# Patient Record
Sex: MALE | Race: WHITE | NOT HISPANIC OR LATINO | URBAN - METROPOLITAN AREA
[De-identification: names, ages, dates, MRNs, and addresses within clinical notes are randomized per-mention and may not be internally consistent; named-entity substitution may affect disease eponyms.]

---

## 2017-02-08 ENCOUNTER — GENERIC CONVERSION - ENCOUNTER (OUTPATIENT)
Dept: OTHER | Facility: OTHER | Age: 67
End: 2017-02-08

## 2017-07-18 ENCOUNTER — ALLSCRIPTS OFFICE VISIT (OUTPATIENT)
Dept: OTHER | Facility: OTHER | Age: 67
End: 2017-07-18
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
## 2017-11-17 ENCOUNTER — GENERIC CONVERSION - ENCOUNTER (OUTPATIENT)
Dept: OTHER | Facility: OTHER | Age: 67
End: 2017-11-17

## 2018-01-10 NOTE — MISCELLANEOUS
Message   Recorded as Task   Date: 02/06/2017 04:36 PM, Created By: Gemini Whatley   Task Name: Care Coordination   Assigned To: [address] [facility]   Regarding Patient: Hilda Bernardo, Status: Active   Comment:    Gemini Whatley - 06 Feb 2017 4:36 PM     TASK CREATED  Caller: Self; Care Coordination; [phone] (Home); [phone] (Work)  traveling to [facility] - wants Levofloxacin or other antibiotic to take with him just in case - will be there from 2/13-2/19 - please advise   Yang Black - 08 Feb 2017 8:47 AM     TASK REASSIGNED: Previously Assigned To Yang Black  traveler's diarrhea  rx sent for levaquin 500 mg x 1 dose  Active Problems    1  Encounter for screening colonoscopy (V76 51) (Z12 11)   2  Encounter for screening for malignant neoplasm of rectum (V76 41) (Z12 12)   3  Primary hypothyroidism (244 9) (E03 9)   4  Screening PSA (prostate specific antigen) (V76 44) (Z12 5)   5  Tick bite (919 4,E906 4) (W57 XXXA)   6  Traveler's diarrhea (009 2) (A09)   7  Type 1 diabetes mellitus without complication (884 83) (M26 3)   8  Vitiligo (709 01) (L80)    Current Meds   1  Advair Diskus 250-50 MCG/DOSE Inhalation Aerosol Powder Breath Activated Recorded   2  HumaLOG KwikPen 100 UNIT/ML SOLN; USE 10 UNITS 3 TIMES DAILY BEFORE   MEALS; Therapy: 31SYO1837 to (Evaluate:23Owc4613)  Requested for: 19FVF2614; Last   Rx:[id number] Ordered   3  Levemir 100 UNIT/ML Subcutaneous Solution; USE 30 UNITS AT BEDTIME; Therapy: 59OPU2528 to (Evaluate:93Ljp3675)  Requested for: 14CON9074; Last   Rx:[id number] Ordered   4  LevoFLOXacin 500 MG Oral Tablet; take 1 tab by mouth x 1 dose at onset of diarrhea; Therapy: 49PDV8663 to (Last Rx:60Aws6530) Ordered   5  OneTouch Ultra Blue In Vitro Strip; TESTING 6 TIMES DAILY  Requested for:   52JLJ2546; Last ZJ:[id number] Ordered   6  Synthroid 150 MCG Oral Tablet (Levothyroxine Sodium);    Therapy: (Recorded:27Sep2013) to Recorded    Allergies    1  No Known Drug Allergies    Signatures   Electronically signed by :  Souleymane Diggs, ; Feb 8 2017  3:16PM EST                       (Author)

## 2018-01-14 VITALS
DIASTOLIC BLOOD PRESSURE: 62 MMHG | WEIGHT: 156 LBS | SYSTOLIC BLOOD PRESSURE: 128 MMHG | BODY MASS INDEX: 27.64 KG/M2 | TEMPERATURE: 98.2 F | HEART RATE: 72 BPM | HEIGHT: 63 IN

## 2018-02-12 ENCOUNTER — TELEPHONE (OUTPATIENT)
Dept: FAMILY MEDICINE CLINIC | Facility: HOSPITAL | Age: 68
End: 2018-02-12

## 2018-02-12 ENCOUNTER — OFFICE VISIT (OUTPATIENT)
Dept: FAMILY MEDICINE CLINIC | Facility: HOSPITAL | Age: 68
End: 2018-02-12
Payer: COMMERCIAL

## 2018-02-12 VITALS
DIASTOLIC BLOOD PRESSURE: 78 MMHG | HEART RATE: 82 BPM | SYSTOLIC BLOOD PRESSURE: 132 MMHG | HEIGHT: 69 IN | WEIGHT: 159 LBS | BODY MASS INDEX: 23.55 KG/M2 | TEMPERATURE: 99.2 F

## 2018-02-12 DIAGNOSIS — J06.9 UPPER RESPIRATORY TRACT INFECTION, UNSPECIFIED TYPE: Primary | ICD-10-CM

## 2018-02-12 DIAGNOSIS — J45.30 MILD PERSISTENT ASTHMA WITHOUT COMPLICATION: ICD-10-CM

## 2018-02-12 DIAGNOSIS — J30.81 CHRONIC ALLERGIC RHINITIS DUE TO ANIMAL HAIR AND DANDER: ICD-10-CM

## 2018-02-12 PROCEDURE — 99214 OFFICE O/P EST MOD 30 MIN: CPT | Performed by: INTERNAL MEDICINE

## 2018-02-12 RX ORDER — BENZONATATE 100 MG/1
100 CAPSULE ORAL 3 TIMES DAILY PRN
Qty: 30 CAPSULE | Refills: 0 | Status: SHIPPED | OUTPATIENT
Start: 2018-02-12 | End: 2018-02-22

## 2018-02-12 RX ORDER — LEVOTHYROXINE SODIUM 0.15 MG/1
150 TABLET ORAL DAILY
COMMUNITY

## 2018-02-12 RX ORDER — MOMETASONE FUROATE 50 UG/1
2 SPRAY, METERED NASAL DAILY
Qty: 17 G | Refills: 3 | Status: SHIPPED | OUTPATIENT
Start: 2018-02-12 | End: 2020-03-10

## 2018-02-12 RX ORDER — ALBUTEROL SULFATE 90 UG/1
2 AEROSOL, METERED RESPIRATORY (INHALATION)
COMMUNITY
Start: 2013-03-13

## 2018-02-12 RX ORDER — MOMETASONE FUROATE 50 UG/1
2 SPRAY, METERED NASAL
COMMUNITY
Start: 2017-05-11 | End: 2018-02-12 | Stop reason: SDUPTHER

## 2018-02-12 RX ORDER — NICOTINE POLACRILEX 2 MG
GUM BUCCAL
COMMUNITY

## 2018-02-12 NOTE — PROGRESS NOTES
Assessment/Plan:    Mild persistent asthma  Reassured pt that no current s/sx of exacerbation but URI's are common triggers - advised to increase Advair to 1 puff INH bid for now until wheezing /cough resolves, rx for [employer] sent, advised to call with new/worsening symptoms or if no better at all by Friday       Diagnoses and all orders for this visit:    Upper respiratory tract infection, unspecified type  Comments:  Pt reassured that s/sx c/w viral URI - symptomatic tx was encouraged and urged to watch for s/sx of asthma exacerbation  Orders:  -     benzonatate (TESSALON PERLES) 100 mg capsule; Take 1 capsule (100 mg total) by mouth 3 (three) times a day as needed for cough for up to 10 days    Chronic allergic rhinitis due to animal hair and dander  -     mometasone (NASONEX) 50 mcg/act nasal spray; 2 sprays into each nostril daily    Mild persistent asthma without complication    Other orders  -     albuterol (PROVENTIL HFA,VENTOLIN HFA) 90 mcg/act inhaler; Inhale 2 puffs  -     B Complex Vitamins (VITAMIN B COMPLEX PO); Take one tablet by mouth daily  -     Biotin 1 MG CAPS; Take by mouth  -     Cholecalciferol 1000 units capsule; Take 1 capsule by mouth daily  -     fluticasone-salmeterol (ADVAIR DISKUS) 100-50 mcg/dose; Inhale 1 puff daily  -     insulin degludec (TRESIBA) injection pen 100 units/mL; Inject 25 Units under the skin daily  -     Discontinue: mometasone (NASONEX) 50 mcg/act nasal spray; 2 sprays into each nostril  -     insulin lispro (HumaLOG) 100 Units/mL SOPN; Up to 15 units tid ac        Rest/fluids/lozenges/hot tea/garles and OTC decongestant was recommended, rx for Tessalon Perles was sent to pharmacy, urged to restart Nasonex; d/w pt that cough can last 4-6 wks but call with new/worsening symptoms      Subjective:      Patient ID: Serenity Peter is a 74 y o  male  HPI Pt here for sick visit  Symptoms started Friday night with sneezing/runny nose/itchy eyes and cough    He states he has a productive cough with yellow phlegm but no significant SOB/wheezing/F but has had some chills  He notes no Sinus pain or pressure/HA but he has had a scratchy throat and "inflammed bronchus"  He has has been using Advil and Robitusan  He has Nasonex at home but has not been using it - needs rx refilled        Review of Systems   Constitutional: Positive for chills  Negative for fatigue and fever  HENT: Positive for congestion  Negative for ear pain, sinus pain and sore throat  Eyes: Negative for pain and redness  Respiratory: Positive for cough  Negative for chest tightness and shortness of breath  Cardiovascular: Negative for chest pain, palpitations and leg swelling  Gastrointestinal: Negative for abdominal pain, blood in stool, constipation, diarrhea, nausea and vomiting  Endocrine: Negative for polydipsia and polyuria  Genitourinary: Negative for difficulty urinating and dysuria  Musculoskeletal: Negative for arthralgias and myalgias  Skin: Negative for rash and wound  Neurological: Negative for dizziness and headaches  Hematological: Does not bruise/bleed easily  Psychiatric/Behavioral: Negative for behavioral problems and confusion  Objective:    Vitals:    02/12/18 1449   BP: 132/78   Pulse: 82   Temp: 99 2 °F (37 3 °C)        Physical Exam   Constitutional: He appears well-developed and well-nourished  HENT:   Head: Normocephalic and atraumatic  Left Ear: External ear normal    Mouth/Throat: No oropharyngeal exudate  R ear with scan blood in outer ear canal but TM intact with nml light reflex and no erythema   Eyes: EOM are normal  Pupils are equal, round, and reactive to light  Right eye exhibits no discharge  Left eye exhibits no discharge  Neck:   B/L submandibular adenopathy   Cardiovascular: Normal rate and regular rhythm  No murmur heard  Pulmonary/Chest: Effort normal  No respiratory distress  He has wheezes  He has no rales     Scattered wheezing B/L lung fields   Abdominal: Soft  He exhibits no distension  There is no tenderness  Musculoskeletal: He exhibits no edema  Lymphadenopathy:     He has cervical adenopathy  Neurological: He is alert  He exhibits normal muscle tone  Skin: Skin is warm and dry  No rash noted  Psychiatric: He has a normal mood and affect   His behavior is normal

## 2019-01-21 ENCOUNTER — TELEPHONE (OUTPATIENT)
Dept: FAMILY MEDICINE CLINIC | Facility: HOSPITAL | Age: 69
End: 2019-01-21

## 2019-04-23 ENCOUNTER — TELEPHONE (OUTPATIENT)
Dept: FAMILY MEDICINE CLINIC | Facility: HOSPITAL | Age: 69
End: 2019-04-23

## 2019-08-16 ENCOUNTER — TELEPHONE (OUTPATIENT)
Dept: FAMILY MEDICINE CLINIC | Facility: HOSPITAL | Age: 69
End: 2019-08-16

## 2019-11-25 ENCOUNTER — OFFICE VISIT (OUTPATIENT)
Dept: UROLOGY | Facility: MEDICAL CENTER | Age: 69
End: 2019-11-25
Payer: COMMERCIAL

## 2019-11-25 VITALS
BODY MASS INDEX: 23.79 KG/M2 | WEIGHT: 157 LBS | DIASTOLIC BLOOD PRESSURE: 80 MMHG | SYSTOLIC BLOOD PRESSURE: 120 MMHG | HEIGHT: 68 IN | HEART RATE: 69 BPM

## 2019-11-25 DIAGNOSIS — E11.69 ERECTILE DYSFUNCTION ASSOCIATED WITH TYPE 2 DIABETES MELLITUS (HCC): Primary | ICD-10-CM

## 2019-11-25 DIAGNOSIS — N40.0 BPH WITHOUT OBSTRUCTION/LOWER URINARY TRACT SYMPTOMS: ICD-10-CM

## 2019-11-25 DIAGNOSIS — N52.1 ERECTILE DYSFUNCTION ASSOCIATED WITH TYPE 2 DIABETES MELLITUS (HCC): Primary | ICD-10-CM

## 2019-11-25 PROCEDURE — 99244 OFF/OP CNSLTJ NEW/EST MOD 40: CPT | Performed by: UROLOGY

## 2019-11-25 RX ORDER — OMEPRAZOLE 20 MG/1
20 CAPSULE, DELAYED RELEASE ORAL DAILY
COMMUNITY
Start: 2018-12-26 | End: 2019-12-26

## 2019-11-25 RX ORDER — VARDENAFIL HYDROCHLORIDE 5 MG/1
TABLET ORAL
COMMUNITY
Start: 2018-10-18 | End: 2019-11-25

## 2019-11-25 RX ORDER — TADALAFIL 20 MG/1
20 TABLET ORAL AS NEEDED
Qty: 5 TABLET | Refills: 0 | Status: SHIPPED | OUTPATIENT
Start: 2019-11-25 | End: 2021-05-19

## 2019-11-25 RX ORDER — RANITIDINE 150 MG/1
150 TABLET ORAL
COMMUNITY
Start: 2018-12-26 | End: 2019-12-26

## 2019-11-25 NOTE — PROGRESS NOTES
Assessment/Plan:  1  Erectile dysfunction secondary to diabetes mellitus-the patient has not responded to Levitra in the past   He will be given a trial of Cialis 20 mg on an as needed basis and if that does not work to produce an adequate erection he will be given an alprostadil prescription and enter an injection therapy pathway    2  BPH without obstruction-patient is due for PSA testing in December of 2019  This will be ordered  ERIKA not suspicious for malignancy  3  Left spermatocele-incidental with history of previous vasectomy-no intervention   yNo problem-specific Assessment & Plan notes found for this encounter  Diagnoses and all orders for this visit:    Erectile dysfunction associated with type 2 diabetes mellitus (HCC)  -     PROSTAGLANDIN PGE1 INJECTABLE 20 MCG/ML, STANDARD, IJ SOLN; 1 mL by Intracavernosal route once for 1 dose  -     tadalafil (CIALIS) 20 MG tablet; Take 1 tablet (20 mg total) by mouth as needed for erectile dysfunction    BPH without obstruction/lower urinary tract symptoms  -     PSA Total, Diagnostic; Future    Other orders  -     OMEGA-3 FATTY ACIDS-VITAMIN E PO; Take one tablet by mouth daily  -     ciclopirox (PENLAC) 8 % solution; Apply to affected nails daily  -     omeprazole (PRILOSEC) 20 mg delayed release capsule; Take 20 mg by mouth daily  -     ranitidine (ZANTAC) 150 mg tablet; Take 150 mg by mouth  -     Discontinue: vardenafil (LEVITRA) 5 MG tablet; One daily as  needed          Subjective:      Patient ID: Kvng Malcolm is a 81 y o  male  HPI  71-year-old male with history of insulin-dependent diabetes mellitus referred by Dr Ashlie Gordon for evaluation of erectile dysfunction  The patient does not get any erectile activity on his own capable of vaginal penetration for the most part  He is able to stimulate adequately to have some sexual activity but this is unreliable and he has problems with rigidity and difficulty maintaining erection    He has not responded adequately to previous treatment with Levitra 5 mg on a daily basis but does not take it on a daily basis noting that it gives him hot flashes and some nasal side effects  The patient now presents for evaluation of erectile dysfunction  He does not to have any significant obstructive or irritative voiding symptoms denying dysuria gross hematuria although the patient does note some early morning decrease in his urinary stream       The following portions of the patient's history were reviewed and updated as appropriate: allergies, current medications, past family history, past medical history, past social history, past surgical history and problem list     Review of Systems   All other systems reviewed and are negative  Objective:      /80   Pulse 69   Ht 5' 8" (1 727 m)   Wt 71 2 kg (157 lb)   BMI 23 87 kg/m²          Physical Exam   Constitutional: He is oriented to person, place, and time  He appears well-developed and well-nourished  No distress  HENT:   Head: Normocephalic and atraumatic  Eyes: EOM are normal    Neck: Neck supple  Pulmonary/Chest: Effort normal  No respiratory distress  Abdominal: Soft  He exhibits no distension  Genitourinary:   Genitourinary Comments: Phallus normal circumcised without cutaneous lesions  Scrotum normal without cutaneous lesions  Testes normal bilaterally without masses or tenderness  Adnexa on the right is normal un on the left there is a spermatocele proximally 1/2 to 2 cm in diameter on the head of the epididymis  ERIKA normal anal verge normal anal sphincter tone no rectal masses palpable and the prostate is approximately 25 g a nodular nontender   Neurological: He is alert and oriented to person, place, and time  Psychiatric: He has a normal mood and affect  His behavior is normal  Judgment and thought content normal    Vitals reviewed

## 2019-11-25 NOTE — LETTER
November 25, 2019     Herbert Jacob DO  [address]    Patient: Miri Demarco   YOB: 1950   Date of Visit: 11/25/2019       Dear Dr Latrell Reyes: Thank you for referring Miri Demarco to me for evaluation  Below are my notes for this consultation  If you have questions, please do not hesitate to call me  I look forward to following your patient along with you  Sincerely,        Shruthi Eli MD        CC: MD Shruthi Reyes MD  11/25/2019 11:42 AM  Sign at close encounter  Assessment/Plan:  1  Erectile dysfunction secondary to diabetes mellitus-the patient has not responded to Levitra in the past   He will be given a trial of Cialis 20 mg on an as needed basis and if that does not work to produce an adequate erection he will be given an alprostadil prescription and enter an injection therapy pathway    2  BPH without obstruction-patient is due for PSA testing in December of 2019  This will be ordered  ERIKA not suspicious for malignancy  3  Left spermatocele-incidental with history of previous vasectomy-no intervention   yNo problem-specific Assessment & Plan notes found for this encounter  Diagnoses and all orders for this visit:    Erectile dysfunction associated with type 2 diabetes mellitus (HCC)  -     PROSTAGLANDIN PGE1 INJECTABLE 20 MCG/ML, STANDARD, IJ SOLN; 1 mL by Intracavernosal route once for 1 dose  -     tadalafil (CIALIS) 20 MG tablet; Take 1 tablet (20 mg total) by mouth as needed for erectile dysfunction    BPH without obstruction/lower urinary tract symptoms  -     PSA Total, Diagnostic; Future    Other orders  -     OMEGA-3 FATTY ACIDS-VITAMIN E PO; Take one tablet by mouth daily  -     ciclopirox (PENLAC) 8 % solution; Apply to affected nails daily  -     omeprazole (PRILOSEC) 20 mg delayed release capsule; Take 20 mg by mouth daily  -     ranitidine (ZANTAC) 150 mg tablet;  Take 150 mg by mouth  -     Discontinue: vardenafil (LEVITRA) 5 MG tablet; One daily as  needed          Subjective:      Patient ID: Juanis Pedroza is a 81 y o  male  HPI  42-year-old male with history of insulin-dependent diabetes mellitus referred by Dr Pedro Renae for evaluation of erectile dysfunction  The patient does not get any erectile activity on his own capable of vaginal penetration for the most part  He is able to stimulate adequately to have some sexual activity but this is unreliable and he has problems with rigidity and difficulty maintaining erection  He has not responded adequately to previous treatment with Levitra 5 mg on a daily basis but does not take it on a daily basis noting that it gives him hot flashes and some nasal side effects  The patient now presents for evaluation of erectile dysfunction  He does not to have any significant obstructive or irritative voiding symptoms denying dysuria gross hematuria although the patient does note some early morning decrease in his urinary stream       The following portions of the patient's history were reviewed and updated as appropriate: allergies, current medications, past family history, past medical history, past social history, past surgical history and problem list     Review of Systems   All other systems reviewed and are negative  Objective:      /80   Pulse 69   Ht 5' 8" (1 727 m)   Wt 71 2 kg (157 lb)   BMI 23 87 kg/m²           Physical Exam   Constitutional: He is oriented to person, place, and time  He appears well-developed and well-nourished  No distress  HENT:   Head: Normocephalic and atraumatic  Eyes: EOM are normal    Neck: Neck supple  Pulmonary/Chest: Effort normal  No respiratory distress  Abdominal: Soft  He exhibits no distension  Genitourinary:   Genitourinary Comments: Phallus normal circumcised without cutaneous lesions  Scrotum normal without cutaneous lesions    Testes normal bilaterally without masses or tenderness  Adnexa on the right is normal un on the left there is a spermatocele proximally 1/2 to 2 cm in diameter on the head of the epididymis  ERIKA normal anal verge normal anal sphincter tone no rectal masses palpable and the prostate is approximately 25 g a nodular nontender   Neurological: He is alert and oriented to person, place, and time  Psychiatric: He has a normal mood and affect  His behavior is normal  Judgment and thought content normal    Vitals reviewed

## 2020-01-06 ENCOUNTER — OFFICE VISIT (OUTPATIENT)
Dept: UROLOGY | Facility: MEDICAL CENTER | Age: 70
End: 2020-01-06
Payer: COMMERCIAL

## 2020-01-06 VITALS
HEART RATE: 59 BPM | SYSTOLIC BLOOD PRESSURE: 120 MMHG | WEIGHT: 157 LBS | DIASTOLIC BLOOD PRESSURE: 70 MMHG | BODY MASS INDEX: 23.79 KG/M2 | HEIGHT: 68 IN

## 2020-01-06 DIAGNOSIS — E11.69 ERECTILE DYSFUNCTION ASSOCIATED WITH TYPE 2 DIABETES MELLITUS (HCC): Primary | ICD-10-CM

## 2020-01-06 DIAGNOSIS — N52.1 ERECTILE DYSFUNCTION ASSOCIATED WITH TYPE 2 DIABETES MELLITUS (HCC): Primary | ICD-10-CM

## 2020-01-06 PROCEDURE — 99213 OFFICE O/P EST LOW 20 MIN: CPT | Performed by: UROLOGY

## 2020-01-06 PROCEDURE — 54235 NJX CORPORA CAVERNOSA RX AGT: CPT | Performed by: UROLOGY

## 2020-01-06 NOTE — PROGRESS NOTES
Assessment/Plan:  1  Erectile dysfunction secondary to diabetes mellitus  Patient did not respond adequately to Cialis 20 mg although he did note that he had 2 mass in superior it the patient however had extreme discomfort in his knee with some swelling as well as extreme discomfort in his esophagus such that he can no longer tolerate this medication  He has failed both Levitra and Cialis and therefore was given a 10 micro g injection of alprostadil today without incident    2 BPH without obstruction -PSA well within normal limits  Repeat yearly    3  Left spermatocele -no intervention  No problem-specific Assessment & Plan notes found for this encounter  There are no diagnoses linked to this encounter  Subjective:      Patient ID: Josue Ku is a 81 y o  male  HPI   82-year-old male with a history of insulin-dependent diabetes mellitus referred by Endocrinology for evaluation of erectile dysfunction  Patient does not get any usable erectile activity on his own and in the past however he has responded to treatment with daily Levitra 5 mg  The patient however no longer response to even maximal doses of Levitra and presents after failing to get an adequate response from Cialis 20 mg  He did note a tumescent response however rigidity was borderline capable of penetration the patient had significant GI upset and pain in his right lower extremity particularly at the knee with swelling associated with taking Cialis  The patient presents today for injection therapy  The following portions of the patient's history were reviewed and updated as appropriate: allergies, current medications, past family history, past medical history, past social history, past surgical history and problem list     Review of Systems   All other systems reviewed and are negative          Objective:      /70   Pulse 59   Ht 5' 8" (1 727 m)   Wt 71 2 kg (157 lb)   BMI 23 87 kg/m²          Physical Exam Constitutional: He is oriented to person, place, and time  He appears well-nourished  No distress  HENT:   Head: Atraumatic  Eyes: EOM are normal    Neck: Neck supple  Cardiovascular: Normal rate  Pulmonary/Chest: Effort normal    Abdominal: Soft  Genitourinary: Penis normal    Neurological: He is alert and oriented to person, place, and time  Psychiatric: He has a normal mood and affect  His behavior is normal  Judgment and thought content normal    Vitals reviewed  Injection corpora cavernosa     Date/Time 1/6/2020 2:18 PM     Performed by  Pallavi Rangel MD     Authorized by Pallavi Rangel MD      Universal Protocol Consent: Verbal consent obtained  Risks and benefits: risks, benefits and alternatives were discussed  Consent given by: patient  Patient understanding: patient states understanding of the procedure being performed  Patient consent: the patient's understanding of the procedure matches consent given  Procedure consent: procedure consent matches procedure scheduled  Relevant documents: relevant documents present and verified  Test results: test results available and properly labeled  Required items: required blood products, implants, devices, and special equipment available  Patient identity confirmed: verbally with patient        Site preparation: Isopropyl alcohol    Local anesthesia used: no     Anesthesia   Local anesthesia used: no     Sedation   Patient sedated: no        Specimen: no   Procedure Details   Procedure Notes:   After prepping the penile shaft with isopropyl alcohol 0 5 cc of a 20 micro g per cc mixture of PGE 1 was administered at by injection into the left corpora  Patient tolerated this without incident    He will return in 1 week for additional dosing  Patient tolerance: Patient tolerated the procedure well with no immediate complications

## 2020-01-28 ENCOUNTER — OFFICE VISIT (OUTPATIENT)
Dept: UROLOGY | Facility: MEDICAL CENTER | Age: 70
End: 2020-01-28
Payer: COMMERCIAL

## 2020-01-28 VITALS
DIASTOLIC BLOOD PRESSURE: 80 MMHG | HEART RATE: 98 BPM | SYSTOLIC BLOOD PRESSURE: 118 MMHG | BODY MASS INDEX: 23.79 KG/M2 | HEIGHT: 68 IN | WEIGHT: 157 LBS

## 2020-01-28 DIAGNOSIS — N52.1 ERECTILE DYSFUNCTION ASSOCIATED WITH TYPE 2 DIABETES MELLITUS (HCC): Primary | ICD-10-CM

## 2020-01-28 DIAGNOSIS — E11.69 ERECTILE DYSFUNCTION ASSOCIATED WITH TYPE 2 DIABETES MELLITUS (HCC): Primary | ICD-10-CM

## 2020-01-28 PROCEDURE — 99213 OFFICE O/P EST LOW 20 MIN: CPT | Performed by: UROLOGY

## 2020-01-28 PROCEDURE — 54235 NJX CORPORA CAVERNOSA RX AGT: CPT | Performed by: UROLOGY

## 2020-01-28 NOTE — PROGRESS NOTES
Injection corpora cavernosa     Date/Time 1/28/2020 9:02 AM     Performed by  Dia Allen MD     Authorized by Dia Allen MD      Universal Protocol Risks and benefits: risks, benefits and alternatives were discussed  Consent given by: patient  Patient understanding: patient states understanding of the procedure being performed  Patient consent: the patient's understanding of the procedure matches consent given  Procedure consent: procedure consent matches procedure scheduled  Patient identity confirmed: verbally with patient        Site preparation: Isopropyl alcohol    Local anesthesia used: no     Anesthesia   Local anesthesia used: no     Sedation   Patient sedated: no        Specimen: no    Culture: no   Procedure Details   Procedure Notes: Patient noted that with a 10 micro g alprostadil injection he got tumescent but there was less than adequate rigidity  He also noted some degree of discomfort with the medication itself  Sensation lasted for approximately 3 hours as did the semi erection  After prepping the penile shaft today with isopropyl alcohol 1 cc of a 20 micro g per cc alprostadil mixture was administered in the left corpora  The patient tolerated the procedure well  He will return in 1 week for repeat injection possibly with Bi Mix    Patient tolerance: Patient tolerated the procedure well with no immediate complications

## 2020-02-06 ENCOUNTER — OFFICE VISIT (OUTPATIENT)
Dept: UROLOGY | Facility: MEDICAL CENTER | Age: 70
End: 2020-02-06
Payer: COMMERCIAL

## 2020-02-06 VITALS — WEIGHT: 157 LBS | BODY MASS INDEX: 23.79 KG/M2 | HEIGHT: 68 IN

## 2020-02-06 DIAGNOSIS — N52.1 ERECTILE DYSFUNCTION ASSOCIATED WITH TYPE 2 DIABETES MELLITUS (HCC): Primary | ICD-10-CM

## 2020-02-06 DIAGNOSIS — E11.69 ERECTILE DYSFUNCTION ASSOCIATED WITH TYPE 2 DIABETES MELLITUS (HCC): Primary | ICD-10-CM

## 2020-02-06 PROCEDURE — 1036F TOBACCO NON-USER: CPT | Performed by: UROLOGY

## 2020-02-06 PROCEDURE — 4040F PNEUMOC VAC/ADMIN/RCVD: CPT | Performed by: UROLOGY

## 2020-02-06 PROCEDURE — 54235 NJX CORPORA CAVERNOSA RX AGT: CPT | Performed by: UROLOGY

## 2020-02-06 PROCEDURE — 99213 OFFICE O/P EST LOW 20 MIN: CPT | Performed by: UROLOGY

## 2020-02-06 PROCEDURE — 1160F RVW MEDS BY RX/DR IN RCRD: CPT | Performed by: UROLOGY

## 2020-02-06 PROCEDURE — 3008F BODY MASS INDEX DOCD: CPT | Performed by: UROLOGY

## 2020-02-06 NOTE — PROGRESS NOTES
Injection corpora cavernosa     Date/Time 2/6/2020 3:38 PM     Performed by  Yadira Beltran MD     Authorized by Yadira Beltran MD      Universal Protocol Consent: Verbal consent obtained  Risks and benefits: risks, benefits and alternatives were discussed  Consent given by: patient  Patient understanding: patient states understanding of the procedure being performed  Patient consent: the patient's understanding of the procedure matches consent given  Procedure consent: procedure consent matches procedure scheduled  Required items: required blood products, implants, devices, and special equipment available  Patient identity confirmed: verbally with patient        Site preparation: Isopropyl alcohol    Local anesthesia used: no     Anesthesia   Local anesthesia used: no     Sedation   Patient sedated: no        Specimen: no    Culture: no   Procedure Details   Procedure Notes: Patient noted that 1 cc or 20 micro g of alprostadil did not adequately give him a rigid reliable erection  The patient did note some discomfort with alprostadil and therefore after isopropyl alcohol was used to prep the penile shaft 0 5 cc of standard Bi Mix was injected in the left corpora  Patient will return for further dosing    There were no complications  Patient tolerance: Patient tolerated the procedure well with no immediate complications

## 2020-03-10 ENCOUNTER — OFFICE VISIT (OUTPATIENT)
Dept: UROLOGY | Facility: MEDICAL CENTER | Age: 70
End: 2020-03-10
Payer: COMMERCIAL

## 2020-03-10 VITALS — HEIGHT: 68 IN | BODY MASS INDEX: 23.79 KG/M2 | WEIGHT: 157 LBS

## 2020-03-10 DIAGNOSIS — N52.1 ERECTILE DYSFUNCTION ASSOCIATED WITH TYPE 2 DIABETES MELLITUS (HCC): Primary | ICD-10-CM

## 2020-03-10 DIAGNOSIS — E11.69 ERECTILE DYSFUNCTION ASSOCIATED WITH TYPE 2 DIABETES MELLITUS (HCC): Primary | ICD-10-CM

## 2020-03-10 PROCEDURE — 54235 NJX CORPORA CAVERNOSA RX AGT: CPT | Performed by: UROLOGY

## 2020-03-10 PROCEDURE — 99213 OFFICE O/P EST LOW 20 MIN: CPT | Performed by: UROLOGY

## 2020-03-10 NOTE — PROGRESS NOTES
Injection corpora cavernosa     Date/Time 3/10/2020 3:39 PM     Performed by  Eric Colmenares MD     Authorized by Eric Colmenares MD      Universal Protocol Consent: Verbal consent obtained  Risks and benefits: risks, benefits and alternatives were discussed  Consent given by: patient  Patient understanding: patient states understanding of the procedure being performed  Patient consent: the patient's understanding of the procedure matches consent given  Procedure consent: procedure consent matches procedure scheduled  Required items: required blood products, implants, devices, and special equipment available  Patient identity confirmed: verbally with patient        Site preparation: Isopropyl alcohol    Local anesthesia used: no     Anesthesia   Local anesthesia used: no     Sedation   Patient sedated: no        Specimen: no    Culture: no   Procedure Details   Procedure Notes: The patient returns to the office and notes that 0 5 cc of Bi Mix gave him a tumescent erection however was not rigid enough for penetration and did last long enough for maintenance  The patient was given 1 cc of Bi Mix into the left corpora after prepping the penile shaft with isopropyl alcohol and was given instructions as to injection technique  The patient will return in 1 week for further consideration towards injection therapy  If he does not get an adequate erection with Bi Mix 1 cc tri mix will be tried    Tri mix prescription was written and the patient will return with that if he  Wishes to further used injection therapy  Patient tolerance: Patient tolerated the procedure well with no immediate complications

## 2020-03-16 ENCOUNTER — TELEPHONE (OUTPATIENT)
Dept: UROLOGY | Facility: MEDICAL CENTER | Age: 70
End: 2020-03-16

## 2020-03-16 NOTE — TELEPHONE ENCOUNTER
COVID Screening Questions    Have you recently traveled? NO  Have you been in contact with any one who is sick? NO  Do you have a cough, fever, or shortness of breath?   NO

## 2020-04-22 ENCOUNTER — TELEPHONE (OUTPATIENT)
Dept: UROLOGY | Facility: MEDICAL CENTER | Age: 70
End: 2020-04-22

## 2020-07-09 ENCOUNTER — TELEPHONE (OUTPATIENT)
Dept: UROLOGY | Facility: MEDICAL CENTER | Age: 70
End: 2020-07-09

## 2020-07-14 ENCOUNTER — OFFICE VISIT (OUTPATIENT)
Dept: UROLOGY | Facility: MEDICAL CENTER | Age: 70
End: 2020-07-14
Payer: COMMERCIAL

## 2020-07-14 VITALS
HEART RATE: 93 BPM | DIASTOLIC BLOOD PRESSURE: 78 MMHG | HEIGHT: 68 IN | TEMPERATURE: 98.6 F | WEIGHT: 160 LBS | SYSTOLIC BLOOD PRESSURE: 110 MMHG | BODY MASS INDEX: 24.25 KG/M2

## 2020-07-14 DIAGNOSIS — E11.69 ERECTILE DYSFUNCTION ASSOCIATED WITH TYPE 2 DIABETES MELLITUS (HCC): Primary | ICD-10-CM

## 2020-07-14 DIAGNOSIS — N52.1 ERECTILE DYSFUNCTION ASSOCIATED WITH TYPE 2 DIABETES MELLITUS (HCC): Primary | ICD-10-CM

## 2020-07-14 DIAGNOSIS — Z12.5 PROSTATE CANCER SCREENING: ICD-10-CM

## 2020-07-14 PROCEDURE — 54235 NJX CORPORA CAVERNOSA RX AGT: CPT | Performed by: UROLOGY

## 2020-07-14 PROCEDURE — 99213 OFFICE O/P EST LOW 20 MIN: CPT | Performed by: UROLOGY

## 2020-07-14 NOTE — PROGRESS NOTES
Injection corpora cavernosa     Date/Time 7/14/2020 12:03 PM     Performed by  Rashel Salgado MD     Authorized by Rashel Salgado MD      Universal Protocol Consent: Verbal consent obtained  Risks and benefits: risks, benefits and alternatives were discussed  Consent given by: patient  Patient understanding: patient states understanding of the procedure being performed  Patient consent: the patient's understanding of the procedure matches consent given  Procedure consent: procedure consent matches procedure scheduled  Required items: required blood products, implants, devices, and special equipment available  Patient identity confirmed: verbally with patient  Time out: Immediately prior to procedure a "time out" was called to verify the correct patient, procedure, equipment, support staff and site/side marked as required  Site preparation: Isopropyl alcohol    Local anesthesia used: no     Anesthesia   Local anesthesia used: no     Sedation   Patient sedated: no        Specimen: no    Culture: no   Procedure Details   Procedure Notes: The patient reports that 1 cc of Bi Mix gave him an erection adequate for sexual intercourse in terms of rigidity with adequate length or duration of affect  He had no side effects  The patient did want to try Tri Mix and was given a 1 cc of standard tri mix injection into the right corpora after prepping with isopropyl alcohol  The patient was able to inject himself without difficulty and expressed understanding of injection techniques including drawing up the solution  Patient will contact me if he is having problems with injection therapy will continue injecting 1 cc of Tri Mix    He will return to the office in approximately 6 months for ERIKA PSA chemistry profile and review of response  Patient tolerance: Patient tolerated the procedure well with no immediate complications

## 2020-10-22 NOTE — ASSESSMENT & PLAN NOTE
Please call Trinity from Dr Simpson's office regarding [facility]    [phone]   Reassured pt that no current s/sx of exacerbation but URI's are common triggers - advised to increase Advair to 1 puff INH bid for now until wheezing /cough resolves, rx for [employer] sent, advised to call with new/worsening symptoms or if no better at all by Friday

## 2021-05-19 ENCOUNTER — OFFICE VISIT (OUTPATIENT)
Dept: UROLOGY | Facility: MEDICAL CENTER | Age: 71
End: 2021-05-19
Payer: COMMERCIAL

## 2021-05-19 VITALS
BODY MASS INDEX: 23.79 KG/M2 | HEART RATE: 107 BPM | DIASTOLIC BLOOD PRESSURE: 82 MMHG | SYSTOLIC BLOOD PRESSURE: 114 MMHG | HEIGHT: 68 IN | WEIGHT: 157 LBS

## 2021-05-19 DIAGNOSIS — N40.1 BPH WITH OBSTRUCTION/LOWER URINARY TRACT SYMPTOMS: ICD-10-CM

## 2021-05-19 DIAGNOSIS — N52.9 ERECTILE DYSFUNCTION DUE TO TYPE 1 DIABETES MELLITUS (HCC): Primary | ICD-10-CM

## 2021-05-19 DIAGNOSIS — N13.8 BPH WITH OBSTRUCTION/LOWER URINARY TRACT SYMPTOMS: ICD-10-CM

## 2021-05-19 DIAGNOSIS — Z12.5 PROSTATE CANCER SCREENING: ICD-10-CM

## 2021-05-19 DIAGNOSIS — E10.69 ERECTILE DYSFUNCTION DUE TO TYPE 1 DIABETES MELLITUS (HCC): Primary | ICD-10-CM

## 2021-05-19 PROCEDURE — 99214 OFFICE O/P EST MOD 30 MIN: CPT | Performed by: UROLOGY

## 2021-05-19 RX ORDER — INSULIN ASPART INJECTION 100 [IU]/ML
INJECTION, SOLUTION SUBCUTANEOUS
COMMUNITY
Start: 2021-05-16

## 2021-05-19 NOTE — PROGRESS NOTES
Assessment/Plan:  1  Erectile dysfunction associated with type 1 diabetes mellitus-patient has responded Tri Mix in the past but notes some discomfort  Prescription for Bi Mix will be offered if the patient continues to have problematic discomfort  2  BPH with lower urinary tract symptoms  Although the patient has an AUA symptom score of only 4 he does note some slight onset of urinary urgency  He does have some slight prostatic enlargement with a normal PSA and ERIKA that confirms enlargement without nodularity  No intervention at this point but of the symptoms get worse consideration towards intervention either pharmacologically or with Uro lift or TURP will be made  3  Prostate cancer screening ERIKA PSA not suspicious  No problem-specific Assessment & Plan notes found for this encounter  Diagnoses and all orders for this visit:    Erectile dysfunction associated with type 2 diabetes mellitus ([facility] [address] )  -     Comprehensive metabolic panel; Future    BPH with obstruction/lower urinary tract symptoms  -     PSA Total, Diagnostic; Future  -     Comprehensive metabolic panel; Future    Prostate cancer screening  -     PSA Total, Diagnostic; Future  -     Comprehensive metabolic panel; Future    Other orders  -     Fiasp FlexTouch 100 units/mL injection pen; UP TO 6 UNITS THREE TIMES DAILY AS DIRECTED BEFORE MEALS          Subjective:      Patient ID: Efren Apple is a 73 y o  male  HPI  62-year-old male with a history of erectile dysfunction type 1 diabetes mellitus has been using Tri Mix injection with some slight discomfort  He returns to obtain refill  He is also noting some slight onset of urinary urgency occasionally with an AUA symptom score 4 only with nocturia once nightly denying dysuria gross hematuria  The patient presents for follow-up    The following portions of the patient's history were reviewed and updated as appropriate: allergies, current medications, past family history, past medical history, past social history, past surgical history and problem list     Review of Systems   Genitourinary: Positive for urgency  All other systems reviewed and are negative  Objective:      /82   Pulse (!) 107   Ht 5' 8" (1 727 m)   Wt 71 2 kg (157 lb)   BMI 23 87 kg/m²          Physical Exam  Vitals signs reviewed  Constitutional:       General: He is not in acute distress  Appearance: Normal appearance  He is normal weight  He is not ill-appearing, toxic-appearing or diaphoretic  HENT:      Head: Normocephalic and atraumatic  Nose: Nose normal       Mouth/Throat:      Mouth: Mucous membranes are moist    Eyes:      Extraocular Movements: Extraocular movements intact  Neck:      Musculoskeletal: Normal range of motion  Pulmonary:      Effort: Pulmonary effort is normal  No respiratory distress  Abdominal:      Palpations: Abdomen is soft  Musculoskeletal: Normal range of motion  Skin:     General: Skin is dry  Neurological:      General: No focal deficit present  Mental Status: He is alert and oriented to person, place, and time  Mental status is at baseline  Psychiatric:         Mood and Affect: Mood normal          Behavior: Behavior normal          Thought Content:  Thought content normal          Judgment: Judgment normal

## 2021-05-21 ENCOUNTER — TELEPHONE (OUTPATIENT)
Dept: UROLOGY | Facility: MEDICAL CENTER | Age: 71
End: 2021-05-21

## 2021-05-21 NOTE — TELEPHONE ENCOUNTER
Script for Trimix 20-1-30, one 2mL vial with NO refill was called into [facility]; verbal order given to Carmella Londono  Ph   Paperwork to Elo Cisse to schedule office visit accordingly  No further action required

## 2021-05-26 NOTE — TELEPHONE ENCOUNTER
Called pt to schedule next appt for Trimix injection  Pt stated he did not need the appt and will follow up for his yearly appt

## 2022-11-01 ENCOUNTER — TELEPHONE (OUTPATIENT)
Dept: UROLOGY | Facility: MEDICAL CENTER | Age: 72
End: 2022-11-01

## 2022-11-07 ENCOUNTER — TELEPHONE (OUTPATIENT)
Dept: OTHER | Facility: OTHER | Age: 72
End: 2022-11-07

## 2022-11-07 DIAGNOSIS — R97.20 ELEVATED PSA: Primary | ICD-10-CM

## 2022-11-07 NOTE — TELEPHONE ENCOUNTER
Patient's Rx for PSA  and he needs a new script  Please fax to [facility] at [phone]  Please call patient and let him know when it has been sent  He has an appt in office on Thursday 11/10/22

## 2022-11-10 ENCOUNTER — OFFICE VISIT (OUTPATIENT)
Dept: UROLOGY | Facility: MEDICAL CENTER | Age: 72
End: 2022-11-10

## 2022-11-10 VITALS
HEART RATE: 85 BPM | SYSTOLIC BLOOD PRESSURE: 118 MMHG | WEIGHT: 148 LBS | HEIGHT: 64 IN | DIASTOLIC BLOOD PRESSURE: 70 MMHG | BODY MASS INDEX: 25.27 KG/M2

## 2022-11-10 DIAGNOSIS — Z12.5 PROSTATE CANCER SCREENING: Primary | ICD-10-CM

## 2022-11-10 DIAGNOSIS — N43.3 LEFT HYDROCELE: ICD-10-CM

## 2022-11-10 RX ORDER — BLOOD-GLUCOSE SENSOR
EACH MISCELLANEOUS
COMMUNITY
Start: 2022-08-27

## 2022-11-10 RX ORDER — TADALAFIL 5 MG/1
5 TABLET ORAL
COMMUNITY
Start: 2022-10-27 | End: 2023-10-27

## 2022-11-10 RX ORDER — INSULIN ASPART 100 [IU]/ML
INJECTION, SOLUTION INTRAVENOUS; SUBCUTANEOUS
COMMUNITY
Start: 2022-09-21

## 2022-11-10 NOTE — PROGRESS NOTES
11/10/2022      Chief Complaint   Patient presents with   • Erectile Dysfunction   • Benign Prostatic Hypertrophy     Assessment and Plan    81 y o  male managed by Dr Elie Vee    1  Benign Prostatic Hyperplasia  · PSA performed 11/8/2022 resulted 0 95  · ERIKA-prostate 40-45 g with no nodules  Smooth, symmetrical   Nontender  · Repeat PSA and ERIKA in 1 year  · Will continue to monitor for worsening/progression of lower urinary tract symptoms  · Discussed dietary behavior modifications to assist in reduction of irritative symptoms  · Follow up in the office in 1 year with AUA, PVR    2  Erectile Dysfunction   · Bimix-prescription refill provided    3  Hydrocele, left   · Ultrasound scrotum and testicle ordered  · Scrotal/testicular exam reveals a left-sided small hydrocele  Right scrotum/testicle unremarkable  Difficult to assess entire left testicle  · Scrotal elevation and immobilization as needed for comfort  · Will call patient with results      History of Present Illness  Antony Rain is a 81 y o  male here for follow up evaluation of  lower urinary tract symptoms secondary benign prostatic hyperplasia  Patient also history of erectile dysfunction  He has been managed by mix with mild-to-moderate results  At this point he is pleased with the results Ace receiving and wishes to continue with by mix  In regards to lower urinary tract symptoms he continues to report no significant change to his symptoms  He reports occasional sensation of incomplete bladder emptying after urinating  He reports mild urgency during the day but does not feel that the symptoms are bothersome at this time  He does report occasional urinary hesitancy associated with delaying the urge to urinate for an extended period of time  Patient reports over the course of the last few weeks noticing an increase in scrotal swelling for which he wishes to further evaluation      Review of Systems   Constitutional: Negative for chills and fever    Respiratory: Negative for cough and shortness of breath  Cardiovascular: Negative for chest pain  Gastrointestinal: Negative for abdominal distention, abdominal pain, blood in stool, nausea and vomiting  Genitourinary: Positive for frequency, scrotal swelling and urgency  Negative for difficulty urinating, dysuria, enuresis, flank pain and hematuria  For occasional mild urinary hesitancy   Skin: Negative for rash  AUA SYMPTOM SCORE    Flowsheet Row Most Recent Value   AUA SYMPTOM SCORE    How often have you had a sensation of not emptying your bladder completely after you finished urinating? 0   How often have you had to urinate again less than two hours after you finished urinating? 1   How often have you found you stopped and started again several times when you urinate? 0   How often have you found it difficult to postpone urination? 2   How often have you had a weak urinary stream? 1   How often have you had to push or strain to begin urination?  0   How many times did you most typically get up to urinate from the time you went to bed at night until the time you got up in the morning? 0   Quality of Life: If you were to spend the rest of your life with your urinary condition just the way it is now, how would you feel about that? 1   AUA SYMPTOM SCORE 4             Past Medical History  Past Medical History:   Diagnosis Date   • Asthma    • [facility]    • Hypothyroidism    • Tick bite     last assessed - [id number]   • Traveler's diarrhea     last assessed - [id number]   • Vitiligo     last assessed - [id number]       Past Social History  Past Surgical History:   Procedure Laterality Date   • TONSILLECTOMY       Social History     Tobacco Use   Smoking Status Never Smoker   Smokeless Tobacco Never Used   Tobacco Comment    Former smoker per [employer]       Past Family History  Family History   Problem Relation Age of Onset   • Breast cancer Mother    • Diabetes Father Diabetes mellitus   • Heart disease Father    • Lymphoma Paternal Grandfather         Pancreatic Lymphoma       Past Social history  Social History     Socioeconomic History   • Marital status: /Civil Union     Spouse name: Not on file   • Number of children: Not on file   • Years of education: Not on file   • Highest education level: Not on file   Occupational History   • Not on file   Tobacco Use   • Smoking status: Never Smoker   • Smokeless tobacco: Never Used   • Tobacco comment: Former smoker per [employer]   Substance and Sexual Activity   • Alcohol use: Yes     Comment: social drinker   • Drug use: No     Comment: Denied history of drug use   • Sexual activity: Not on file   Other Topics Concern   • Not on file   Social History Narrative   • Not on file     Social Determinants of Health     Financial Resource Strain: Not on file   Food Insecurity: Not on file   Transportation Needs: Not on file   Physical Activity: Not on file   Stress: Not on file   Social Connections: Not on file   Intimate Partner Violence: Not on file   Housing Stability: Not on file       Current Medications  Current Outpatient Medications   Medication Sig Dispense Refill   • albuterol (PROVENTIL HFA,VENTOLIN HFA) 90 mcg/act inhaler Inhale 2 puffs     • B Complex Vitamins (VITAMIN B COMPLEX PO) Take one tablet by mouth daily     • Biotin 1 MG CAPS Take by mouth     • Cholecalciferol 1000 units capsule Take 1 capsule by mouth daily     • ciclopirox (PENLAC) 8 % solution Apply to affected nails daily     • Continuous Blood Gluc Sensor (Dexcom G6 Sensor) MISC CHANGE EVERY 10 DAYS  E10 9     • Fiasp FlexTouch 100 units/mL injection pen UP TO 6 UNITS THREE TIMES DAILY AS DIRECTED BEFORE MEALS     • fluticasone-salmeterol (Advair) 100-50 mcg/dose inhaler Inhale 1 puff daily     • glucose blood test strip by In Vitro route     • insulin lispro (HumaLOG) 100 Units/mL SOPN Up to 15 units tid ac     • levothyroxine 150 mcg tablet Take 150 mcg by mouth daily     • NovoLOG 100 UNIT/ML injection 150 UNITS EVERY 3 DAYS VIA INSULIN PUMP      • OMEGA-3 FATTY ACIDS-VITAMIN E PO Take one tablet by mouth daily     • tadalafil (CIALIS) 5 MG tablet Take 5 mg by mouth     • insulin degludec (TRESIBA) injection pen 100 units/mL Inject 25 Units under the skin daily     • mometasone (NASONEX) 50 mcg/act nasal spray 2 sprays into each nostril daily 17 g 3   • omeprazole (PRILOSEC) 20 mg delayed release capsule Take 20 mg by mouth daily     • ranitidine (ZANTAC) 150 mg tablet Take 150 mg by mouth       No current facility-administered medications for this visit  Allergies  No Known Allergies      The following portions of the patient's history were reviewed and updated as appropriate: allergies, current medications, past medical history, past social history, past surgical history and problem list       Vitals  Vitals:    11/10/22 1103   BP: 118/70   Pulse: 85   Weight: 67 1 kg (148 lb)   Height: 5' 4" (1 626 m)           Physical Exam  Physical Exam  Vitals reviewed  Constitutional:       General: He is not in acute distress  Appearance: Normal appearance  He is normal weight  HENT:      Head: Normocephalic  Pulmonary:      Effort: No respiratory distress  Breath sounds: Normal breath sounds  Genitourinary:     Penis: Normal        Comments: Small left-sided hydrocele noted on scrotal exam   ERIKA-prostate 40-45 g with no nodules  Smooth, symmetrical   Nontender  Skin:     General: Skin is warm and dry  Neurological:      General: No focal deficit present  Mental Status: He is alert and oriented to person, place, and time  Psychiatric:         Mood and Affect: Mood normal          Behavior: Behavior normal        Results  No results found for this or any previous visit (from the past 1 hour(s))  ]  No results found for: PSA  No results found for: GLUCOSE, CALCIUM, NA, K, CO2, CL, BUN, CREATININE  No results found for: WBC, HGB, HCT, MCV, PLT    Orders  Orders Placed This Encounter   Procedures   • US scrotum and testicles     Standing Status:   Future     Standing Expiration Date:   11/10/2026     Scheduling Instructions:      No prep required  Please bring your insurance cards, a form of photo ID and a list of your medications with you  Arrive 15 minutes prior to your appointment time in order to register  To schedule this appointment, please contact Central Scheduling at [phone]  • PSA, Total Screen     This is a patient instruction: This test is non-fasting  Please drink two glasses of water morning of bloodwork          Standing Status:   Future     Standing Expiration Date:   5/10/2024       EL Liao

## 2022-11-29 ENCOUNTER — HOSPITAL ENCOUNTER (OUTPATIENT)
Dept: RADIOLOGY | Age: 72
Discharge: HOME | End: 2022-11-29

## 2022-11-29 DIAGNOSIS — N43.3 LEFT HYDROCELE: ICD-10-CM

## 2022-12-02 ENCOUNTER — TELEPHONE (OUTPATIENT)
Dept: UROLOGY | Facility: AMBULATORY SURGERY CENTER | Age: 72
End: 2022-12-02

## 2022-12-02 NOTE — TELEPHONE ENCOUNTER
----- Message from Gege Layne 81  sent at 12/2/2022  8:35 AM EST -----  Okay to notify patient that is ultrasound of scrotum and testicle is revealing bilateral epididymal cysts with the larger 1 being on the left than the right otherwise unremarkable findings  He should continue with scrotal elevation and immobilizatio  n as needed for comfort, ice/heat as needed for comfort  If his discomfort worsens we will need to bring him back office for evaluation with physician  Thank you

## 2022-12-02 NOTE — TELEPHONE ENCOUNTER
Left a voicemail per EL Vazquez instruction that the scrotal ultrasound did reveal epididymal cysts and that he should continue utilizing precautionary measure of rest, heat and ice compress and scrotal support  If pain increases above 5, or more symptoms arise such as: abdominal pain, fevers, nausea he is to reach our office and/or go to nearest emergency room for evaluation

## 2023-07-12 LAB — PSA SERPL-MCNC: 0.89 NG/ML

## 2024-01-08 RX ORDER — MONTELUKAST SODIUM 10 MG/1
10 TABLET ORAL
COMMUNITY
Start: 2023-01-01

## 2024-01-08 RX ORDER — BUDESONIDE AND FORMOTEROL FUMARATE DIHYDRATE 80; 4.5 UG/1; UG/1
2 AEROSOL RESPIRATORY (INHALATION) 2 TIMES DAILY
COMMUNITY
Start: 2023-01-01

## 2024-01-09 ENCOUNTER — OFFICE VISIT (OUTPATIENT)
Dept: UROLOGY | Facility: MEDICAL CENTER | Age: 74
End: 2024-01-09
Payer: MEDICARE

## 2024-01-09 VITALS
HEIGHT: 69 IN | BODY MASS INDEX: 22.19 KG/M2 | OXYGEN SATURATION: 98 % | SYSTOLIC BLOOD PRESSURE: 112 MMHG | DIASTOLIC BLOOD PRESSURE: 70 MMHG | HEART RATE: 78 BPM | WEIGHT: 149.8 LBS

## 2024-01-09 DIAGNOSIS — N43.40 SPERMATOCELE OF EPIDIDYMIS: Primary | ICD-10-CM

## 2024-01-09 DIAGNOSIS — N40.0 BPH WITHOUT OBSTRUCTION/LOWER URINARY TRACT SYMPTOMS: ICD-10-CM

## 2024-01-09 DIAGNOSIS — E11.69 ERECTILE DYSFUNCTION ASSOCIATED WITH TYPE 2 DIABETES MELLITUS: ICD-10-CM

## 2024-01-09 DIAGNOSIS — N52.1 ERECTILE DYSFUNCTION ASSOCIATED WITH TYPE 2 DIABETES MELLITUS: ICD-10-CM

## 2024-01-09 DIAGNOSIS — Z12.5 PROSTATE CANCER SCREENING: ICD-10-CM

## 2024-01-09 PROCEDURE — 99214 OFFICE O/P EST MOD 30 MIN: CPT | Performed by: UROLOGY

## 2024-01-09 RX ORDER — TADALAFIL 20 MG/1
20 TABLET ORAL DAILY PRN
COMMUNITY
Start: 2023-04-28 | End: 2024-04-27

## 2024-01-09 RX ORDER — GLUCAGON 3 MG/1
3 POWDER NASAL
COMMUNITY
Start: 2023-11-07

## 2024-01-09 NOTE — PROGRESS NOTES
Assessment/Plan:  #1.  Left spermatocele-has gotten slightly larger by patient estimate but still basically asymptomatic-do not recommend surgical repair at this time    2.  Erectile dysfunction due to diabetes mellitus-patient has been using by Meckes and will reinstitute injection therapy-prescription provided    3.  Prostate cancer screening-ERIKA and PSA not suspicious for malignancy-repeat 1 year.    4.  BPH without lower tract symptoms-patient voiding adequately-no intervention      No problem-specific Assessment & Plan notes found for this encounter.       Diagnoses and all orders for this visit:    Spermatocele of epididymis    Erectile dysfunction associated with type 2 diabetes mellitus   -     Comprehensive metabolic panel; Future  -     Papaverine-Phentolamine (PHENTOLAMINE / PAPAVERINE, STANDARD BIMIX, 1 MG / 30 MG INJECTION); 1 mL by Intracavernosal route once for 1 dose    Prostate cancer screening  -     PSA Total, Diagnostic; Future    BPH without obstruction/lower urinary tract symptoms  -     PSA Total, Diagnostic; Future  -     Comprehensive metabolic panel; Future    Other orders  -     tadalafil (Cialis) 20 MG tablet; Take 20 mg by mouth daily as needed  -     Glucagon (Baqsimi One Pack) 3 MG/DOSE POWD; 3 mg          Subjective:      Patient ID: Reji Byrne is a 73 y.o. male.    HPI  73-year-old male presents for prostate examination as well as for follow-up in regard to a left spermatocele which seems to be slightly enlarging.  He is voiding well without significant irritative or obstructive voiding symptoms.  The patient has a normal PSA and manages his erectile dysfunction using injection therapy i.e. by Meckes.  He did have questions concerning alternative therapies.  The patient presents for follow-up.  The following portions of the patient's history were reviewed and updated as appropriate: allergies, current medications, past family history, past medical history, past social history,  "past surgical history, and problem list.    Review of Systems   All other systems reviewed and are negative.        Objective:      /70 (BP Location: Left arm, Patient Position: Sitting, Cuff Size: Standard)   Pulse 78   Ht 5' 9\" (1.753 m)   Wt 67.9 kg (149 lb 12.8 oz)   SpO2 98%   BMI 22.12 kg/m²          Physical Exam  Vitals reviewed.   Constitutional:       General: He is not in acute distress.     Appearance: Normal appearance. He is not ill-appearing, toxic-appearing or diaphoretic.   HENT:      Head: Normocephalic and atraumatic.      Nose: Nose normal.      Mouth/Throat:      Mouth: Mucous membranes are moist.   Eyes:      Extraocular Movements: Extraocular movements intact.   Pulmonary:      Effort: Pulmonary effort is normal. No respiratory distress.   Abdominal:      General: There is no distension.      Palpations: Abdomen is soft.   Genitourinary:     Comments: Phallus normal circumcised without cutaneous lesions.  Meatus patent normally placed.  Scrotum normal without cutaneous lesions.  Examination of the testes revealed testes bilaterally normal without masses or tenderness.  There is a large left spermatocele at the tail of the epididymis with smaller palpable epididymal cysts.  No solid masses palpably appreciated.  ERIKA normal anal verge normal anal sphincter tone no palpable rectal masses.  Prostate 30 g a nodular nontender  Musculoskeletal:      Cervical back: Neck supple.   Neurological:      Mental Status: He is alert and oriented to person, place, and time.   Psychiatric:         Mood and Affect: Mood normal.         Behavior: Behavior normal.         Thought Content: Thought content normal.         Judgment: Judgment normal.           "

## 2024-01-09 NOTE — LETTER
January 9, 2024     Tom Paul DO  [address]  [address]  [place] [address]    Patient: Reji Byrne   YOB: 1950   Date of Visit: 1/9/2024       Dear Dr. Paul:    Thank you for referring Reji Byrne to me for evaluation. Below are my notes for this consultation.    If you have questions, please do not hesitate to call me. I look forward to following your patient along with you.         Sincerely,        Polo Aguillon MD        CC: No Recipients    Polo Aguillon MD  1/9/2024 10:46 AM  Sign when Signing Visit  Assessment/Plan:  #1.  Left spermatocele-has gotten slightly larger by patient estimate but still basically asymptomatic-do not recommend surgical repair at this time    2.  Erectile dysfunction due to diabetes mellitus-[facility] and will reinstitute injection therapy-prescription provided    3.  Prostate cancer screening-ERIKA and PSA not suspicious for malignancy-repeat 1 year.    4.  BPH without lower tract symptoms-patient voiding adequately-no intervention      No problem-specific Assessment & Plan notes found for this encounter.       Diagnoses and all orders for this visit:    Spermatocele of epididymis    Erectile dysfunction associated with type 2 diabetes mellitus   -     Comprehensive metabolic panel; Future  -     Papaverine-Phentolamine (PHENTOLAMINE / PAPAVERINE, STANDARD BIMIX, 1 MG / 30 MG INJECTION); 1 mL by Intracavernosal route once for 1 dose    Prostate cancer screening  -     PSA Total, Diagnostic; Future    BPH without obstruction/lower urinary tract symptoms  -     PSA Total, Diagnostic; Future  -     Comprehensive metabolic panel; Future    Other orders  -     tadalafil (Cialis) 20 MG tablet; Take 20 mg by mouth daily as needed  -     Glucagon (Baqsimi One Pack) 3 MG/DOSE POWD; 3 mg          Subjective:      Patient ID: Reji Byrne is a 73 y.o. male.    HPI  73-year-old male presents for prostate examination as  "well as for follow-up in regard to a left spermatocele which seems to be slightly enlarging.  He is voiding well without significant irritative or obstructive voiding symptoms.  The patient has a normal PSA and manages his erectile dysfunction using injection therapy i.e. by Meckes.  He did have questions concerning alternative therapies.  The patient presents for follow-up.  The following portions of the patient's history were reviewed and updated as appropriate: allergies, current medications, past family history, past medical history, past social history, past surgical history, and problem list.    Review of Systems   All other systems reviewed and are negative.        Objective:      /70 (BP Location: Left arm, Patient Position: Sitting, Cuff Size: Standard)   Pulse 78   Ht 5' 9\" (1.753 m)   Wt 67.9 kg (149 lb 12.8 oz)   SpO2 98%   BMI 22.12 kg/m²          Physical Exam  Vitals reviewed.   Constitutional:       General: He is not in acute distress.     Appearance: Normal appearance. He is not ill-appearing, toxic-appearing or diaphoretic.   HENT:      Head: Normocephalic and atraumatic.      Nose: Nose normal.      Mouth/Throat:      Mouth: Mucous membranes are moist.   Eyes:      Extraocular Movements: Extraocular movements intact.   Pulmonary:      Effort: Pulmonary effort is normal. No respiratory distress.   Abdominal:      General: There is no distension.      Palpations: Abdomen is soft.   Genitourinary:     Comments: Phallus normal circumcised without cutaneous lesions.  Meatus patent normally placed.  Scrotum normal without cutaneous lesions.  Examination of the testes revealed testes bilaterally normal without masses or tenderness.  There is a large left spermatocele at the tail of the epididymis with smaller palpable epididymal cysts.  No solid masses palpably appreciated.  ERIKA normal anal verge normal anal sphincter tone no palpable rectal masses.  Prostate 30 g a nodular " nontender  Musculoskeletal:      Cervical back: Neck supple.   Neurological:      Mental Status: He is alert and oriented to person, place, and time.   Psychiatric:         Mood and Affect: Mood normal.         Behavior: Behavior normal.         Thought Content: Thought content normal.         Judgment: Judgment normal.

## 2025-01-03 LAB — PSA SERPL-MCNC: 1.37 NG/ML

## 2025-01-09 NOTE — PROGRESS NOTES
Name: Reji Byrne      : 1950      MRN: [id number]  Encounter Provider: EL Conrad  Encounter Date: 2025   Enc[facility]  :  Assessment & Plan  Screening for prostate cancer  -Most recent PSA 1.37  -Follow up in 1 year with repeat PSA   Orders:    PSA, Total Screen; Future    Epididymal cyst  -Asymptomatic-patient denies pain or discomfort  -On exam left epididymal cyst apparent, right epididymal cyst not appreciated.  -Ultrasound 2022: Simple right epididymal cyst 4 x 3 x 5 mm and 4 x 5 x 6 mm.  Left epididymal cyst 4.3 x 2.5 x 4 cm.  Smaller left epididymal cyst measures 8 x 5 x 6 mm.       Erectile dysfunction associated with type 2 diabetes mellitus  (HCC)  -did trial Bimix (1mg/30mg injection) in the past.   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      Lab Results   Component Value Date    HGBA1C 6.0 (H) 2023                History of Present Illness   Reji Byrne is a 74 y.o. male who presents today for routine follow up. Pt most recent PSA 1.37. Denies any voiding complaints. Does have a history of erectile dysfunction that he used bimix for. Pt states that it has been a long time since he used bimix and had a lot of questions regarding medication, and SE. He states he used trimix once before and had a lot of burning so he was switched to bimix. He states he did use this once however he notes he had a lot redness during one injection. 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     Review of Systems   Constitutional:  Negative for chills and fever.   Gastrointestinal:  Negative for abdominal pain.   Genitourinary:  Negative for difficulty urinating, dysuria, flank pain, frequency, hematuria and urgency.         "  Objective   There were no vitals taken for this visit.    Physical Exam  Vitals reviewed.   Constitutional:       General: He is not in acute distress.     Appearance: Normal appearance. He is normal weight. He is not toxic-appearing.   HENT:      Head: Normocephalic and atraumatic.   Eyes:      Conjunctiva/sclera: Conjunctivae normal.   Cardiovascular:      Rate and Rhythm: Normal rate.   Pulmonary:      Effort: Pulmonary effort is normal.   Abdominal:      Palpations: Abdomen is soft.   Genitourinary:     Comments: Left epididymal cyst appreciated. Nontender, mobile.  Musculoskeletal:         General: Normal range of motion.      Cervical back: Normal range of motion.   Skin:     General: Skin is warm and dry.   Neurological:      Mental Status: He is alert and oriented to person, place, and time.   Psychiatric:         Mood and Affect: Mood normal.         Behavior: Behavior normal.          Results  Lab Results   Component Value Date    PSA 1.37 01/03/2025    PSA 0.89 07/12/2023     Lab Results   Component Value Date    CALCIUM 8.8 01/03/2025    K 4.1 01/03/2025    CO2 27 01/03/2025     01/03/2025    BUN 25 01/03/2025    CREATININE 0.82 01/03/2025     No results found for: \"WBC\", \"HGB\", \"HCT\", \"MCV\", \"PLT\"    Office Urine Dip  No results found for this or any previous visit (from the past hour).]      "

## 2025-01-13 RX ORDER — INSULIN GLARGINE 100 [IU]/ML
100 INJECTION, SOLUTION SUBCUTANEOUS DAILY
COMMUNITY
Start: 2024-12-16

## 2025-01-14 ENCOUNTER — OFFICE VISIT (OUTPATIENT)
Dept: UROLOGY | Facility: MEDICAL CENTER | Age: 75
End: 2025-01-14
Payer: MEDICARE

## 2025-01-14 VITALS
DIASTOLIC BLOOD PRESSURE: 58 MMHG | OXYGEN SATURATION: 97 % | BODY MASS INDEX: 24.99 KG/M2 | HEART RATE: 81 BPM | WEIGHT: 150 LBS | HEIGHT: 65 IN | SYSTOLIC BLOOD PRESSURE: 100 MMHG

## 2025-01-14 DIAGNOSIS — Z12.5 SCREENING FOR PROSTATE CANCER: Primary | ICD-10-CM

## 2025-01-14 DIAGNOSIS — N50.3 EPIDIDYMAL CYST: ICD-10-CM

## 2025-01-14 DIAGNOSIS — N52.1 ERECTILE DYSFUNCTION ASSOCIATED WITH TYPE 2 DIABETES MELLITUS  (HCC): ICD-10-CM

## 2025-01-14 DIAGNOSIS — E11.69 ERECTILE DYSFUNCTION ASSOCIATED WITH TYPE 2 DIABETES MELLITUS  (HCC): ICD-10-CM

## 2025-01-14 PROCEDURE — 99213 OFFICE O/P EST LOW 20 MIN: CPT

## 2025-01-14 NOTE — ASSESSMENT & PLAN NOTE
-did trial Bimix (1mg/30mg injection) in the past.   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      Lab Results   Component Value Date    HGBA1C 6.0 (H) 07/12/2023